# Patient Record
Sex: FEMALE | Race: WHITE | Employment: FULL TIME | URBAN - METROPOLITAN AREA
[De-identification: names, ages, dates, MRNs, and addresses within clinical notes are randomized per-mention and may not be internally consistent; named-entity substitution may affect disease eponyms.]

---

## 2017-05-06 ENCOUNTER — HOSPITAL ENCOUNTER (OUTPATIENT)
Dept: LAB | Age: 52
Discharge: HOME | End: 2017-05-06

## 2017-05-06 PROCEDURE — 99001 SPECIMEN HANDLING PT-LAB: CPT | Performed by: FAMILY MEDICINE

## 2021-04-21 ENCOUNTER — HOSPITAL ENCOUNTER (OUTPATIENT)
Dept: PREADMISSION TESTING | Age: 56
Discharge: HOME | End: 2021-04-21
Payer: COMMERCIAL

## 2021-04-21 ENCOUNTER — TRANSCRIBE ORDER (OUTPATIENT)
Dept: REGISTRATION | Age: 56
End: 2021-04-21

## 2021-04-21 DIAGNOSIS — Z01.812 BLOOD TESTS PRIOR TO TREATMENT OR PROCEDURE: ICD-10-CM

## 2021-04-21 DIAGNOSIS — M16.11 PRIMARY OSTEOARTHRITIS OF RIGHT HIP: Primary | ICD-10-CM

## 2021-04-21 DIAGNOSIS — M16.11 PRIMARY OSTEOARTHRITIS OF RIGHT HIP: ICD-10-CM

## 2021-04-21 LAB
ALBUMIN SERPL-MCNC: 3.8 G/DL (ref 3.4–5)
ALBUMIN/GLOB SERPL: 1.4 {RATIO} (ref 0.8–1.7)
ALP SERPL-CCNC: 90 U/L (ref 45–117)
ALT SERPL-CCNC: 23 U/L (ref 13–56)
ANION GAP SERPL CALC-SCNC: 3 MMOL/L (ref 3–18)
APPEARANCE UR: CLEAR
APTT PPP: 29.5 SEC (ref 23–36.4)
AST SERPL-CCNC: 15 U/L (ref 10–38)
BACTERIA URNS QL MICRO: ABNORMAL /HPF
BASOPHILS # BLD: 0 K/UL (ref 0–0.1)
BASOPHILS NFR BLD: 1 % (ref 0–2)
BILIRUB SERPL-MCNC: 0.5 MG/DL (ref 0.2–1)
BILIRUB UR QL: NEGATIVE
BUN SERPL-MCNC: 13 MG/DL (ref 7–18)
BUN/CREAT SERPL: 20 (ref 12–20)
CALCIUM SERPL-MCNC: 9.2 MG/DL (ref 8.5–10.1)
CHLORIDE SERPL-SCNC: 107 MMOL/L (ref 100–111)
CO2 SERPL-SCNC: 33 MMOL/L (ref 21–32)
COLOR UR: YELLOW
CREAT SERPL-MCNC: 0.65 MG/DL (ref 0.6–1.3)
DIFFERENTIAL METHOD BLD: NORMAL
EOSINOPHIL # BLD: 0.2 K/UL (ref 0–0.4)
EOSINOPHIL NFR BLD: 3 % (ref 0–5)
EPITH CASTS URNS QL MICRO: ABNORMAL /LPF (ref 0–5)
ERYTHROCYTE [DISTWIDTH] IN BLOOD BY AUTOMATED COUNT: 13.2 % (ref 11.6–14.5)
ERYTHROCYTE [SEDIMENTATION RATE] IN BLOOD: 7 MM/HR (ref 0–30)
EST. AVERAGE GLUCOSE BLD GHB EST-MCNC: 123 MG/DL
GLOBULIN SER CALC-MCNC: 2.8 G/DL (ref 2–4)
GLUCOSE SERPL-MCNC: 122 MG/DL (ref 74–99)
GLUCOSE UR STRIP.AUTO-MCNC: NEGATIVE MG/DL
HBA1C MFR BLD: 5.9 % (ref 4.2–5.6)
HCT VFR BLD AUTO: 43.4 % (ref 35–45)
HGB BLD-MCNC: 13.7 G/DL (ref 12–16)
HGB UR QL STRIP: NEGATIVE
INR PPP: 1.1 (ref 0.8–1.2)
KETONES UR QL STRIP.AUTO: NEGATIVE MG/DL
LEUKOCYTE ESTERASE UR QL STRIP.AUTO: ABNORMAL
LYMPHOCYTES # BLD: 1.6 K/UL (ref 0.9–3.6)
LYMPHOCYTES NFR BLD: 31 % (ref 21–52)
MCH RBC QN AUTO: 28.8 PG (ref 24–34)
MCHC RBC AUTO-ENTMCNC: 31.6 G/DL (ref 31–37)
MCV RBC AUTO: 91.2 FL (ref 74–97)
MONOCYTES # BLD: 0.5 K/UL (ref 0.05–1.2)
MONOCYTES NFR BLD: 9 % (ref 3–10)
NEUTS SEG # BLD: 3 K/UL (ref 1.8–8)
NEUTS SEG NFR BLD: 56 % (ref 40–73)
NITRITE UR QL STRIP.AUTO: NEGATIVE
PH UR STRIP: 6.5 [PH] (ref 5–8)
PLATELET # BLD AUTO: 209 K/UL (ref 135–420)
PMV BLD AUTO: 11.7 FL (ref 9.2–11.8)
POTASSIUM SERPL-SCNC: 4.3 MMOL/L (ref 3.5–5.5)
PROT SERPL-MCNC: 6.6 G/DL (ref 6.4–8.2)
PROT UR STRIP-MCNC: NEGATIVE MG/DL
PROTHROMBIN TIME: 14.3 SEC (ref 11.5–15.2)
RBC # BLD AUTO: 4.76 M/UL (ref 4.2–5.3)
RBC #/AREA URNS HPF: ABNORMAL /HPF (ref 0–5)
SODIUM SERPL-SCNC: 143 MMOL/L (ref 136–145)
SP GR UR REFRACTOMETRY: 1.01 (ref 1–1.03)
UROBILINOGEN UR QL STRIP.AUTO: 1 EU/DL (ref 0.2–1)
WBC # BLD AUTO: 5.3 K/UL (ref 4.6–13.2)
WBC URNS QL MICRO: ABNORMAL /HPF (ref 0–5)

## 2021-04-21 PROCEDURE — 80053 COMPREHEN METABOLIC PANEL: CPT

## 2021-04-21 PROCEDURE — 85730 THROMBOPLASTIN TIME PARTIAL: CPT

## 2021-04-21 PROCEDURE — 81001 URINALYSIS AUTO W/SCOPE: CPT

## 2021-04-21 PROCEDURE — 83036 HEMOGLOBIN GLYCOSYLATED A1C: CPT

## 2021-04-21 PROCEDURE — 85610 PROTHROMBIN TIME: CPT

## 2021-04-21 PROCEDURE — 85652 RBC SED RATE AUTOMATED: CPT

## 2021-04-21 PROCEDURE — 93005 ELECTROCARDIOGRAM TRACING: CPT

## 2021-04-21 PROCEDURE — 85025 COMPLETE CBC W/AUTO DIFF WBC: CPT

## 2021-04-21 PROCEDURE — 36415 COLL VENOUS BLD VENIPUNCTURE: CPT

## 2021-04-22 LAB
ATRIAL RATE: 59 BPM
BACTERIA SPEC CULT: ABNORMAL
BACTERIA SPEC CULT: ABNORMAL
CALCULATED P AXIS, ECG09: 42 DEGREES
CALCULATED R AXIS, ECG10: 20 DEGREES
CALCULATED T AXIS, ECG11: 46 DEGREES
DIAGNOSIS, 93000: NORMAL
P-R INTERVAL, ECG05: 142 MS
Q-T INTERVAL, ECG07: 452 MS
QRS DURATION, ECG06: 82 MS
QTC CALCULATION (BEZET), ECG08: 447 MS
SERVICE CMNT-IMP: ABNORMAL
VENTRICULAR RATE, ECG03: 59 BPM

## 2021-05-11 ENCOUNTER — HOSPITAL ENCOUNTER (OUTPATIENT)
Dept: PREADMISSION TESTING | Age: 56
Discharge: HOME | End: 2021-05-11
Payer: COMMERCIAL

## 2021-05-11 PROBLEM — M16.11 PRIMARY LOCALIZED OSTEOARTHRITIS OF RIGHT HIP: Chronic | Status: ACTIVE | Noted: 2021-05-11

## 2021-05-11 PROCEDURE — U0003 INFECTIOUS AGENT DETECTION BY NUCLEIC ACID (DNA OR RNA); SEVERE ACUTE RESPIRATORY SYNDROME CORONAVIRUS 2 (SARS-COV-2) (CORONAVIRUS DISEASE [COVID-19]), AMPLIFIED PROBE TECHNIQUE, MAKING USE OF HIGH THROUGHPUT TECHNOLOGIES AS DESCRIBED BY CMS-2020-01-R: HCPCS

## 2021-05-11 RX ORDER — LEVOTHYROXINE SODIUM 50 UG/1
50 TABLET ORAL
Status: CANCELLED | OUTPATIENT
Start: 2021-05-12

## 2021-05-11 NOTE — H&P
[address] [facility] [address] Medicine  History and Physical Exam    Patient: Richard Ibarra MRN: [id number]  SSN: [id number]    YOB: 1965  Age: 54 y.o. Sex: female      Subjective:      Chief Complaint: right hip pain    History of Present Illness:  Patient complains of right hip pain and difficulty ambulating, which has progressed over the past several months. X-rays showed osteoarthritis of the joint. The patient's pain has persisted and progressed despite conservative treatments and therapies. The patient has been previously treated with nsaids. The patient has at this time opted for surgical intervention. Past Medical History:   Diagnosis Date    Arthritis     Gestational diabetes     Primary localized osteoarthritis of right hip 2021    Thyroid disease     hypo    Type 2 diabetes mellitus ([facility] [address]      Past Surgical History:   Procedure Laterality Date    HX  SECTION   &     HX GASTRIC BYPASS      HX HEENT Bilateral 2019    upper blepharoplasty     Social History     Occupational History    Not on file   Tobacco Use    Smoking status: Former Smoker     Quit date:      Years since quittin.3    Smokeless tobacco: Never Used   Substance and Sexual Activity    Alcohol use: Not Currently    Drug use: Not Currently    Sexual activity: Yes     Prior to Admission medications    Medication Sig Start Date End Date Taking? Authorizing Provider   levothyroxine (synthroid) 50 mcg tablet Take 50 mcg by mouth Daily (before breakfast). Provider, Historical   levothyroxine (synthroid) 25 mcg tablet Take 25 mcg by mouth. Saturday and     Provider, Historical       Allergies: No Known Allergies     Review of Systems:  A comprehensive review of systems was negative except for that written in the History of Present Illness.     Objective:       Physical Exam:  HEENT: Normocephalic, atraumatic  Lungs:  Clear to auscultation  Heart:   Regular rate and rhythm  Abdomen: Soft  Extremities:  Pain with range of motion of the right hip. Passive flexion  degrees,                       passive internal rotation 0-10 degrees, with pain throughout ROM,                        passive external rotation 10-20 degrees with pain at the arc of motion. Antalgic gait noted. Assessment:      Arthritis of the right hip. Plan:       Proceed with scheduled RIGHT TOTAL HIP ARTHROPLASTY. The various methods of treatment have been discussed with the patient and family. After consideration of risks, benefits, and other options for treatment, the patient has consented to surgical interventions. Questions were answered and preoperative teaching was done by Dr Mauricio Meza. The patient would benefit from the use of Vancomycin for antibiotic prophylaxis due to increased risk of infection.     Signed By: VANE Griffiths     May 11, 2021

## 2021-05-11 NOTE — DISCHARGE INSTRUCTIONS
[address] [facility]   Patient Discharge Instructions    Juan Pablo Javier / [id number] : 1965    Admitted (Not on file) Discharged: 2021     IF YOU HAVE ANY PROBLEMS ONCE YOU ARE  [address]:   Main office number: [phone]    Your follow up appointment to see either Dr. Kamla Chappell PA-C, or [facility] as scheduled in 2 weeks. If you are unsure of your appointment date call the office at [phone]. Medication Instructions     · Resume your home medictions as directed, you may have directed not to resume supplements until after your follow up. · A prescription for pain medication has been given   · It is important that you take the medication exactly as they are prescribed. · Keep your medication in the bottles provided by the pharmacist and keep a list of the medication names, dosages, and times to be taken in your wallet. · Do not take other medications without consulting your doctor. What to do at [address] [place] [address] Ave your prehospital diet. If you have excessive nausea or vomitting call your doctor's office. Be sure to maintain adequate fluid intake. Some pain medications may cause constipation. Remember to drink fluids, stay as active as possible, and eat plenty of fiber-rich foods. Begin In-Home Physical Therapy; 3 times a week to work on gait training, range of motion, strengthening, and weight bearing exercises as tolerable. Continue to use your walker or cane when walking. May progress from the walker to a cane to complete total bearing as tolerable. Patient may shower. Wrap incision with plastic wrap/covering to prevent incision from getting wet. Avoid complete immersion. YOUR DRESSING SHOULD BE CHANGED BY YOUR HOME HEALTH NURSE 5-7 AFTER SURGERY ACCORDING TO THE DATE WRITTEN ON YOUR DRESSING.           When to Call    - Call if you have a temperature greater then 101  - Unable to keep food down  - Are unable to bear any wieght   - Need a pain medication refill     Information obtained by :  I understand that if any problems occur once I am at home I am to contact my physician. I understand and acknowledge receipt of the instructions indicated above.                                                                                                                                            Physician's or R.N.'s Signature                                                                  Date/Time                                                                                                                                              Patient or Representative Signature                                                          Date/Time

## 2021-05-12 LAB — SARS-COV-2, COV2NT: NOT DETECTED

## 2021-05-12 NOTE — NURSE NAVIGATOR
Haris Higgins watched the joint seminar online and received a preoperative education booklet in anticipation of joint replacement surgery.      Orthopaedic Nurse Navigator

## 2021-05-14 ENCOUNTER — ANESTHESIA EVENT (OUTPATIENT)
Dept: SURGERY | Age: 56
End: 2021-05-14
Payer: COMMERCIAL

## 2021-05-17 ENCOUNTER — ANESTHESIA (OUTPATIENT)
Dept: SURGERY | Age: 56
End: 2021-05-17
Payer: COMMERCIAL

## 2021-05-17 ENCOUNTER — HOME HEALTH ADMISSION (OUTPATIENT)
Dept: HOME HEALTH SERVICES | Facility: HOME HEALTH | Age: 56
End: 2021-05-17
Payer: COMMERCIAL

## 2021-05-17 ENCOUNTER — HOSPITAL ENCOUNTER (OUTPATIENT)
Age: 56
Discharge: HOME HEALTH | End: 2021-05-17
Attending: ORTHOPAEDIC SURGERY | Admitting: ORTHOPAEDIC SURGERY
Payer: COMMERCIAL

## 2021-05-17 ENCOUNTER — APPOINTMENT (OUTPATIENT)
Dept: GENERAL RADIOLOGY | Age: 56
End: 2021-05-17
Attending: PHYSICIAN ASSISTANT
Payer: COMMERCIAL

## 2021-05-17 ENCOUNTER — APPOINTMENT (OUTPATIENT)
Dept: GENERAL RADIOLOGY | Age: 56
End: 2021-05-17
Attending: ORTHOPAEDIC SURGERY
Payer: COMMERCIAL

## 2021-05-17 VITALS
HEART RATE: 60 BPM | TEMPERATURE: 97.3 F | DIASTOLIC BLOOD PRESSURE: 63 MMHG | RESPIRATION RATE: 16 BRPM | OXYGEN SATURATION: 99 % | BODY MASS INDEX: 31.85 KG/M2 | SYSTOLIC BLOOD PRESSURE: 115 MMHG | WEIGHT: 186.56 LBS | HEIGHT: 64 IN

## 2021-05-17 DIAGNOSIS — M16.11 PRIMARY LOCALIZED OSTEOARTHRITIS OF RIGHT HIP: Primary | Chronic | ICD-10-CM

## 2021-05-17 LAB
ABO + RH BLD: NORMAL
BLOOD GROUP ANTIBODIES SERPL: NORMAL
GLUCOSE BLD STRIP.AUTO-MCNC: 125 MG/DL (ref 70–110)
GLUCOSE BLD STRIP.AUTO-MCNC: 173 MG/DL (ref 70–110)
SPECIMEN EXP DATE BLD: NORMAL

## 2021-05-17 PROCEDURE — 77030012508 HC MSK AIRWY LMA AMBU -A: Performed by: ANESTHESIOLOGY

## 2021-05-17 PROCEDURE — 77030031139 HC SUT VCRL2 J&J -A: Performed by: ORTHOPAEDIC SURGERY

## 2021-05-17 PROCEDURE — 97161 PT EVAL LOW COMPLEX 20 MIN: CPT

## 2021-05-17 PROCEDURE — 36415 COLL VENOUS BLD VENIPUNCTURE: CPT

## 2021-05-17 PROCEDURE — 74011250636 HC RX REV CODE- 250/636: Performed by: ORTHOPAEDIC SURGERY

## 2021-05-17 PROCEDURE — 74011250636 HC RX REV CODE- 250/636: Performed by: PHYSICIAN ASSISTANT

## 2021-05-17 PROCEDURE — 74011000250 HC RX REV CODE- 250: Performed by: ORTHOPAEDIC SURGERY

## 2021-05-17 PROCEDURE — 77030011264 HC ELECTRD BLD EXT COVD -A: Performed by: ORTHOPAEDIC SURGERY

## 2021-05-17 PROCEDURE — 77030038010: Performed by: ORTHOPAEDIC SURGERY

## 2021-05-17 PROCEDURE — 74011636637 HC RX REV CODE- 636/637: Performed by: ANESTHESIOLOGY

## 2021-05-17 PROCEDURE — 97530 THERAPEUTIC ACTIVITIES: CPT

## 2021-05-17 PROCEDURE — 77030037713 HC CLOSR DEV INCIS ZIP STRY -B: Performed by: ORTHOPAEDIC SURGERY

## 2021-05-17 PROCEDURE — 82962 GLUCOSE BLOOD TEST: CPT

## 2021-05-17 PROCEDURE — 76210000016 HC OR PH I REC 1 TO 1.5 HR: Performed by: ORTHOPAEDIC SURGERY

## 2021-05-17 PROCEDURE — 76060000033 HC ANESTHESIA 1 TO 1.5 HR: Performed by: ORTHOPAEDIC SURGERY

## 2021-05-17 PROCEDURE — 74011250636 HC RX REV CODE- 250/636: Performed by: ANESTHESIOLOGY

## 2021-05-17 PROCEDURE — C1776 JOINT DEVICE (IMPLANTABLE): HCPCS | Performed by: ORTHOPAEDIC SURGERY

## 2021-05-17 PROCEDURE — 77030012893: Performed by: ORTHOPAEDIC SURGERY

## 2021-05-17 PROCEDURE — 74011250637 HC RX REV CODE- 250/637: Performed by: ANESTHESIOLOGY

## 2021-05-17 PROCEDURE — 97166 OT EVAL MOD COMPLEX 45 MIN: CPT

## 2021-05-17 PROCEDURE — 76010000149 HC OR TIME 1 TO 1.5 HR: Performed by: ORTHOPAEDIC SURGERY

## 2021-05-17 PROCEDURE — 77030040361 HC SLV COMPR DVT MDII -B: Performed by: ORTHOPAEDIC SURGERY

## 2021-05-17 PROCEDURE — 76210000028 HC REC RM PH II 4 TO 4.5 HR

## 2021-05-17 PROCEDURE — 77030041075 HC DRSG AG OPTIFRM MDII -B: Performed by: ORTHOPAEDIC SURGERY

## 2021-05-17 PROCEDURE — 74011250636 HC RX REV CODE- 250/636: Performed by: NURSE ANESTHETIST, CERTIFIED REGISTERED

## 2021-05-17 PROCEDURE — 77030022295 HC SEAL BPLR VSL DISP MEDT -F: Performed by: ORTHOPAEDIC SURGERY

## 2021-05-17 PROCEDURE — 2709999900 HC NON-CHARGEABLE SUPPLY: Performed by: ORTHOPAEDIC SURGERY

## 2021-05-17 PROCEDURE — 97116 GAIT TRAINING THERAPY: CPT

## 2021-05-17 PROCEDURE — 74011250637 HC RX REV CODE- 250/637: Performed by: ORTHOPAEDIC SURGERY

## 2021-05-17 PROCEDURE — 77030020782 HC GWN BAIR PAWS FLX 3M -B: Performed by: ORTHOPAEDIC SURGERY

## 2021-05-17 PROCEDURE — 77030003666 HC NDL SPINAL BD -A: Performed by: ORTHOPAEDIC SURGERY

## 2021-05-17 PROCEDURE — 77030013708 HC HNDPC SUC IRR PULS STRY –B: Performed by: ORTHOPAEDIC SURGERY

## 2021-05-17 PROCEDURE — 74011000250 HC RX REV CODE- 250: Performed by: NURSE ANESTHETIST, CERTIFIED REGISTERED

## 2021-05-17 PROCEDURE — 97535 SELF CARE MNGMENT TRAINING: CPT

## 2021-05-17 PROCEDURE — 86901 BLOOD TYPING SEROLOGIC RH(D): CPT

## 2021-05-17 PROCEDURE — 77030027138 HC INCENT SPIROMETER -A: Performed by: ORTHOPAEDIC SURGERY

## 2021-05-17 PROCEDURE — 73501 X-RAY EXAM HIP UNI 1 VIEW: CPT

## 2021-05-17 DEVICE — THREE HOLE, 50 MM
Type: IMPLANTABLE DEVICE | Site: HIP | Status: FUNCTIONAL
Brand: LEGEND ACETABULAR SHELL

## 2021-05-17 DEVICE — FEMORAL HEAD 36-12/14+0
Type: IMPLANTABLE DEVICE | Site: HIP | Status: FUNCTIONAL
Brand: DELTA CERAMIC FEMORAL HEAD

## 2021-05-17 DEVICE — SIZE 12 STD COLLAR
Type: IMPLANTABLE DEVICE | Site: HIP | Status: FUNCTIONAL
Brand: ENTRADA HIP STEM

## 2021-05-17 DEVICE — HIP H2 TOT ADV OTHER HD IMPL CAPPED H2 OD: Type: IMPLANTABLE DEVICE | Site: HIP | Status: FUNCTIONAL

## 2021-05-17 DEVICE — ACETABULAR LINER NEUTRAL 36/50
Type: IMPLANTABLE DEVICE | Site: HIP | Status: FUNCTIONAL
Brand: LEGEND

## 2021-05-17 RX ORDER — VANCOMYCIN HYDROCHLORIDE
1250 ONCE
Status: COMPLETED | OUTPATIENT
Start: 2021-05-17 | End: 2021-05-17

## 2021-05-17 RX ORDER — SODIUM CHLORIDE 0.9 % (FLUSH) 0.9 %
5-40 SYRINGE (ML) INJECTION AS NEEDED
Status: DISCONTINUED | OUTPATIENT
Start: 2021-05-17 | End: 2021-05-17 | Stop reason: HOSPADM

## 2021-05-17 RX ORDER — METOCLOPRAMIDE HYDROCHLORIDE 5 MG/ML
10 INJECTION INTRAMUSCULAR; INTRAVENOUS
Status: DISCONTINUED | OUTPATIENT
Start: 2021-05-17 | End: 2021-05-17 | Stop reason: HOSPADM

## 2021-05-17 RX ORDER — HYDROMORPHONE HYDROCHLORIDE 1 MG/ML
0.5 INJECTION, SOLUTION INTRAMUSCULAR; INTRAVENOUS; SUBCUTANEOUS
Status: DISCONTINUED | OUTPATIENT
Start: 2021-05-17 | End: 2021-05-17 | Stop reason: HOSPADM

## 2021-05-17 RX ORDER — LIDOCAINE HYDROCHLORIDE 20 MG/ML
INJECTION, SOLUTION EPIDURAL; INFILTRATION; INTRACAUDAL; PERINEURAL AS NEEDED
Status: DISCONTINUED | OUTPATIENT
Start: 2021-05-17 | End: 2021-05-17 | Stop reason: HOSPADM

## 2021-05-17 RX ORDER — CEFAZOLIN SODIUM/WATER 2 G/20 ML
2 SYRINGE (ML) INTRAVENOUS ONCE
Status: COMPLETED | OUTPATIENT
Start: 2021-05-17 | End: 2021-05-17

## 2021-05-17 RX ORDER — VANCOMYCIN HYDROCHLORIDE
1250 EVERY 12 HOURS
Status: DISCONTINUED | OUTPATIENT
Start: 2021-05-17 | End: 2021-05-17 | Stop reason: HOSPADM

## 2021-05-17 RX ORDER — HYDROMORPHONE HYDROCHLORIDE 1 MG/ML
0.2 INJECTION, SOLUTION INTRAMUSCULAR; INTRAVENOUS; SUBCUTANEOUS AS NEEDED
Status: DISCONTINUED | OUTPATIENT
Start: 2021-05-17 | End: 2021-05-17 | Stop reason: HOSPADM

## 2021-05-17 RX ORDER — NALOXONE HYDROCHLORIDE 0.4 MG/ML
0.1 INJECTION, SOLUTION INTRAMUSCULAR; INTRAVENOUS; SUBCUTANEOUS AS NEEDED
Status: DISCONTINUED | OUTPATIENT
Start: 2021-05-17 | End: 2021-05-17 | Stop reason: HOSPADM

## 2021-05-17 RX ORDER — NALOXONE HYDROCHLORIDE 0.4 MG/ML
0.4 INJECTION, SOLUTION INTRAMUSCULAR; INTRAVENOUS; SUBCUTANEOUS AS NEEDED
Status: DISCONTINUED | OUTPATIENT
Start: 2021-05-17 | End: 2021-05-17 | Stop reason: HOSPADM

## 2021-05-17 RX ORDER — ZOLPIDEM TARTRATE 5 MG/1
5-10 TABLET ORAL
Status: DISCONTINUED | OUTPATIENT
Start: 2021-05-17 | End: 2021-05-17 | Stop reason: HOSPADM

## 2021-05-17 RX ORDER — CEFADROXIL 500 MG/1
500 CAPSULE ORAL 2 TIMES DAILY
Qty: 10 CAP | Refills: 0 | Status: SHIPPED | OUTPATIENT
Start: 2021-05-17 | End: 2021-05-22

## 2021-05-17 RX ORDER — ACETAMINOPHEN 500 MG
1000 TABLET ORAL ONCE
Status: COMPLETED | OUTPATIENT
Start: 2021-05-17 | End: 2021-05-17

## 2021-05-17 RX ORDER — OXYCODONE HYDROCHLORIDE 5 MG/1
5 TABLET ORAL
Status: DISCONTINUED | OUTPATIENT
Start: 2021-05-17 | End: 2021-05-17 | Stop reason: HOSPADM

## 2021-05-17 RX ORDER — GLUCOSAMINE/CHONDR SU A SOD 750-600 MG
TABLET ORAL DAILY
COMMUNITY
End: 2021-05-17

## 2021-05-17 RX ORDER — FLUMAZENIL 0.1 MG/ML
0.2 INJECTION INTRAVENOUS
Status: DISCONTINUED | OUTPATIENT
Start: 2021-05-17 | End: 2021-05-17 | Stop reason: HOSPADM

## 2021-05-17 RX ORDER — DEXMEDETOMIDINE HYDROCHLORIDE 100 UG/ML
INJECTION, SOLUTION INTRAVENOUS AS NEEDED
Status: DISCONTINUED | OUTPATIENT
Start: 2021-05-17 | End: 2021-05-17 | Stop reason: HOSPADM

## 2021-05-17 RX ORDER — DEXTROSE 50 % IN WATER (D50W) INTRAVENOUS SYRINGE
25-50 AS NEEDED
Status: DISCONTINUED | OUTPATIENT
Start: 2021-05-17 | End: 2021-05-17 | Stop reason: HOSPADM

## 2021-05-17 RX ORDER — SODIUM CHLORIDE 9 MG/ML
300 INJECTION, SOLUTION INTRAVENOUS CONTINUOUS
Status: DISCONTINUED | OUTPATIENT
Start: 2021-05-17 | End: 2021-05-17 | Stop reason: HOSPADM

## 2021-05-17 RX ORDER — OXYCODONE HYDROCHLORIDE 5 MG/1
5 TABLET ORAL
Qty: 60 TAB | Refills: 0 | Status: SHIPPED | OUTPATIENT
Start: 2021-05-17 | End: 2021-05-24

## 2021-05-17 RX ORDER — CEFAZOLIN SODIUM/WATER 2 G/20 ML
2 SYRINGE (ML) INTRAVENOUS EVERY 8 HOURS
Status: DISCONTINUED | OUTPATIENT
Start: 2021-05-17 | End: 2021-05-17 | Stop reason: HOSPADM

## 2021-05-17 RX ORDER — PANTOPRAZOLE SODIUM 40 MG/1
40 TABLET, DELAYED RELEASE ORAL DAILY
Status: DISCONTINUED | OUTPATIENT
Start: 2021-05-17 | End: 2021-05-17 | Stop reason: HOSPADM

## 2021-05-17 RX ORDER — KETOROLAC TROMETHAMINE 15 MG/ML
15 INJECTION, SOLUTION INTRAMUSCULAR; INTRAVENOUS EVERY 6 HOURS
Status: DISCONTINUED | OUTPATIENT
Start: 2021-05-17 | End: 2021-05-17 | Stop reason: HOSPADM

## 2021-05-17 RX ORDER — TRANEXAMIC ACID 650 1/1
1950 TABLET ORAL ONCE
Status: DISCONTINUED | OUTPATIENT
Start: 2021-05-17 | End: 2021-05-17

## 2021-05-17 RX ORDER — PANTOPRAZOLE SODIUM 40 MG/1
40 TABLET, DELAYED RELEASE ORAL DAILY
Status: DISCONTINUED | OUTPATIENT
Start: 2021-05-17 | End: 2021-05-17

## 2021-05-17 RX ORDER — SODIUM CHLORIDE 9 MG/ML
125 INJECTION, SOLUTION INTRAVENOUS CONTINUOUS
Status: DISCONTINUED | OUTPATIENT
Start: 2021-05-17 | End: 2021-05-17 | Stop reason: HOSPADM

## 2021-05-17 RX ORDER — INSULIN LISPRO 100 [IU]/ML
INJECTION, SOLUTION INTRAVENOUS; SUBCUTANEOUS ONCE
Status: COMPLETED | OUTPATIENT
Start: 2021-05-17 | End: 2021-05-17

## 2021-05-17 RX ORDER — ASPIRIN 325 MG
325 TABLET ORAL 2 TIMES DAILY
Qty: 42 TAB | Refills: 0 | Status: SHIPPED | OUTPATIENT
Start: 2021-05-17 | End: 2021-06-07

## 2021-05-17 RX ORDER — SODIUM CHLORIDE, SODIUM LACTATE, POTASSIUM CHLORIDE, CALCIUM CHLORIDE 600; 310; 30; 20 MG/100ML; MG/100ML; MG/100ML; MG/100ML
125 INJECTION, SOLUTION INTRAVENOUS CONTINUOUS
Status: DISCONTINUED | OUTPATIENT
Start: 2021-05-17 | End: 2021-05-17 | Stop reason: HOSPADM

## 2021-05-17 RX ORDER — GLYCOPYRROLATE 0.2 MG/ML
INJECTION INTRAMUSCULAR; INTRAVENOUS AS NEEDED
Status: DISCONTINUED | OUTPATIENT
Start: 2021-05-17 | End: 2021-05-17 | Stop reason: HOSPADM

## 2021-05-17 RX ORDER — OXYCODONE HYDROCHLORIDE 5 MG/1
10 TABLET ORAL
Status: DISCONTINUED | OUTPATIENT
Start: 2021-05-17 | End: 2021-05-17 | Stop reason: HOSPADM

## 2021-05-17 RX ORDER — HYDROMORPHONE HYDROCHLORIDE 2 MG/ML
INJECTION, SOLUTION INTRAMUSCULAR; INTRAVENOUS; SUBCUTANEOUS AS NEEDED
Status: DISCONTINUED | OUTPATIENT
Start: 2021-05-17 | End: 2021-05-17 | Stop reason: HOSPADM

## 2021-05-17 RX ORDER — ACETAMINOPHEN 325 MG/1
650 TABLET ORAL EVERY 6 HOURS
Status: DISCONTINUED | OUTPATIENT
Start: 2021-05-17 | End: 2021-05-17 | Stop reason: HOSPADM

## 2021-05-17 RX ORDER — KETOROLAC TROMETHAMINE 15 MG/ML
INJECTION, SOLUTION INTRAMUSCULAR; INTRAVENOUS AS NEEDED
Status: DISCONTINUED | OUTPATIENT
Start: 2021-05-17 | End: 2021-05-17 | Stop reason: HOSPADM

## 2021-05-17 RX ORDER — ASPIRIN 325 MG
325 TABLET, DELAYED RELEASE (ENTERIC COATED) ORAL 2 TIMES DAILY
Status: DISCONTINUED | OUTPATIENT
Start: 2021-05-17 | End: 2021-05-17 | Stop reason: HOSPADM

## 2021-05-17 RX ORDER — SODIUM CHLORIDE 0.9 % (FLUSH) 0.9 %
5-40 SYRINGE (ML) INJECTION EVERY 8 HOURS
Status: DISCONTINUED | OUTPATIENT
Start: 2021-05-17 | End: 2021-05-17 | Stop reason: HOSPADM

## 2021-05-17 RX ORDER — PROPOFOL 10 MG/ML
INJECTION, EMULSION INTRAVENOUS AS NEEDED
Status: DISCONTINUED | OUTPATIENT
Start: 2021-05-17 | End: 2021-05-17 | Stop reason: HOSPADM

## 2021-05-17 RX ORDER — DEXAMETHASONE SODIUM PHOSPHATE 4 MG/ML
4 INJECTION, SOLUTION INTRA-ARTICULAR; INTRALESIONAL; INTRAMUSCULAR; INTRAVENOUS; SOFT TISSUE ONCE
Status: COMPLETED | OUTPATIENT
Start: 2021-05-17 | End: 2021-05-17

## 2021-05-17 RX ORDER — DOCUSATE SODIUM 100 MG/1
100 CAPSULE, LIQUID FILLED ORAL 2 TIMES DAILY
Status: DISCONTINUED | OUTPATIENT
Start: 2021-05-17 | End: 2021-05-17 | Stop reason: HOSPADM

## 2021-05-17 RX ORDER — LANOLIN ALCOHOL/MO/W.PET/CERES
1 CREAM (GRAM) TOPICAL 3 TIMES DAILY
Status: DISCONTINUED | OUTPATIENT
Start: 2021-05-17 | End: 2021-05-17 | Stop reason: HOSPADM

## 2021-05-17 RX ORDER — TRANEXAMIC ACID 10 MG/ML
1 INJECTION, SOLUTION INTRAVENOUS SEE ADMIN INSTRUCTIONS
Status: COMPLETED | OUTPATIENT
Start: 2021-05-17 | End: 2021-05-17

## 2021-05-17 RX ORDER — DEXAMETHASONE SODIUM PHOSPHATE 4 MG/ML
8 INJECTION, SOLUTION INTRA-ARTICULAR; INTRALESIONAL; INTRAMUSCULAR; INTRAVENOUS; SOFT TISSUE ONCE
Status: DISCONTINUED | OUTPATIENT
Start: 2021-05-17 | End: 2021-05-17

## 2021-05-17 RX ORDER — MIDAZOLAM HYDROCHLORIDE 1 MG/ML
INJECTION, SOLUTION INTRAMUSCULAR; INTRAVENOUS AS NEEDED
Status: DISCONTINUED | OUTPATIENT
Start: 2021-05-17 | End: 2021-05-17 | Stop reason: HOSPADM

## 2021-05-17 RX ORDER — MAGNESIUM SULFATE 100 %
4 CRYSTALS MISCELLANEOUS AS NEEDED
Status: DISCONTINUED | OUTPATIENT
Start: 2021-05-17 | End: 2021-05-17 | Stop reason: HOSPADM

## 2021-05-17 RX ORDER — MELOXICAM 7.5 MG/1
7.5 TABLET ORAL 2 TIMES DAILY
Qty: 28 TAB | Refills: 0 | Status: SHIPPED | OUTPATIENT
Start: 2021-05-17 | End: 2021-05-31

## 2021-05-17 RX ORDER — SODIUM CHLORIDE, SODIUM LACTATE, POTASSIUM CHLORIDE, CALCIUM CHLORIDE 600; 310; 30; 20 MG/100ML; MG/100ML; MG/100ML; MG/100ML
1000 INJECTION, SOLUTION INTRAVENOUS CONTINUOUS
Status: DISCONTINUED | OUTPATIENT
Start: 2021-05-17 | End: 2021-05-17 | Stop reason: HOSPADM

## 2021-05-17 RX ORDER — LANOLIN ALCOHOL/MO/W.PET/CERES
1000 CREAM (GRAM) TOPICAL DAILY
COMMUNITY

## 2021-05-17 RX ORDER — DIPHENHYDRAMINE HYDROCHLORIDE 50 MG/ML
12.5 INJECTION, SOLUTION INTRAMUSCULAR; INTRAVENOUS
Status: DISCONTINUED | OUTPATIENT
Start: 2021-05-17 | End: 2021-05-17 | Stop reason: HOSPADM

## 2021-05-17 RX ORDER — ONDANSETRON 2 MG/ML
INJECTION INTRAMUSCULAR; INTRAVENOUS AS NEEDED
Status: DISCONTINUED | OUTPATIENT
Start: 2021-05-17 | End: 2021-05-17 | Stop reason: HOSPADM

## 2021-05-17 RX ORDER — ONDANSETRON 2 MG/ML
4 INJECTION INTRAMUSCULAR; INTRAVENOUS
Status: DISCONTINUED | OUTPATIENT
Start: 2021-05-17 | End: 2021-05-17 | Stop reason: HOSPADM

## 2021-05-17 RX ADMIN — CEFAZOLIN 2 G: 10 INJECTION, POWDER, FOR SOLUTION INTRAVENOUS at 09:14

## 2021-05-17 RX ADMIN — PANTOPRAZOLE SODIUM 40 MG: 40 TABLET, DELAYED RELEASE ORAL at 09:08

## 2021-05-17 RX ADMIN — DEXAMETHASONE SODIUM PHOSPHATE 4 MG: 4 INJECTION, SOLUTION INTRAMUSCULAR; INTRAVENOUS at 09:08

## 2021-05-17 RX ADMIN — VANCOMYCIN HYDROCHLORIDE 1250 MG: 10 INJECTION, POWDER, LYOPHILIZED, FOR SOLUTION INTRAVENOUS at 09:14

## 2021-05-17 RX ADMIN — SODIUM CHLORIDE, SODIUM LACTATE, POTASSIUM CHLORIDE, AND CALCIUM CHLORIDE 1000 ML: 600; 310; 30; 20 INJECTION, SOLUTION INTRAVENOUS at 08:04

## 2021-05-17 RX ADMIN — HYDROMORPHONE HYDROCHLORIDE 1 MG: 2 INJECTION, SOLUTION INTRAMUSCULAR; INTRAVENOUS; SUBCUTANEOUS at 11:00

## 2021-05-17 RX ADMIN — MIDAZOLAM 2 MG: 1 INJECTION INTRAMUSCULAR; INTRAVENOUS at 10:52

## 2021-05-17 RX ADMIN — ONDANSETRON HYDROCHLORIDE 4 MG: 2 INJECTION INTRAMUSCULAR; INTRAVENOUS at 11:02

## 2021-05-17 RX ADMIN — KETOROLAC TROMETHAMINE 30 MG: 15 INJECTION, SOLUTION INTRAMUSCULAR; INTRAVENOUS at 11:39

## 2021-05-17 RX ADMIN — PROPOFOL 200 MG: 10 INJECTION, EMULSION INTRAVENOUS at 11:00

## 2021-05-17 RX ADMIN — SODIUM CHLORIDE, SODIUM LACTATE, POTASSIUM CHLORIDE, AND CALCIUM CHLORIDE 125 ML/HR: 600; 310; 30; 20 INJECTION, SOLUTION INTRAVENOUS at 08:04

## 2021-05-17 RX ADMIN — GLYCOPYRROLATE 0.2 MG: 0.2 INJECTION INTRAMUSCULAR; INTRAVENOUS at 10:52

## 2021-05-17 RX ADMIN — LIDOCAINE HYDROCHLORIDE 80 MG: 20 INJECTION, SOLUTION EPIDURAL; INFILTRATION; INTRACAUDAL; PERINEURAL at 11:00

## 2021-05-17 RX ADMIN — DEXMEDETOMIDINE HYDROCHLORIDE 16 MCG: 100 INJECTION, SOLUTION INTRAVENOUS at 11:02

## 2021-05-17 RX ADMIN — TRANEXAMIC ACID 1 G: 10 INJECTION, SOLUTION INTRAVENOUS at 11:36

## 2021-05-17 RX ADMIN — ACETAMINOPHEN 1000 MG: 500 TABLET ORAL at 09:08

## 2021-05-17 RX ADMIN — SODIUM CHLORIDE, SODIUM LACTATE, POTASSIUM CHLORIDE, AND CALCIUM CHLORIDE 1000 ML: 600; 310; 30; 20 INJECTION, SOLUTION INTRAVENOUS at 12:44

## 2021-05-17 RX ADMIN — TRANEXAMIC ACID 1 G: 10 INJECTION, SOLUTION INTRAVENOUS at 11:02

## 2021-05-17 RX ADMIN — INSULIN LISPRO 3 UNITS: 100 INJECTION, SOLUTION INTRAVENOUS; SUBCUTANEOUS at 12:26

## 2021-05-17 NOTE — PROGRESS NOTES
Problem: Mobility Impaired (Adult and Pediatric)  Goal: *Acute Goals and Plan of Care (Insert Text)  Description: Physical Therapy short term goals initiated 05/17/21, to be achieved in 2 days. Pt will:   1. Perform bed mobility with indep in prep for OOB activity. 2. Perform sit <> stand transfers with RW and S in prep for functional mobility and ambulation. 3. Ambulate 150 ft with RW and S in prep for household and community mobility. 4. Ascend/descend 2 stairs with 1 HR and S for safe home entry. 5/17/2021 1650 by Rashaun Coulter  Note: [x]  Patient has met MD grayson mays for d/c home   [x]  Recommend HH with 24 hour adult care   []  Benefit from additional acute PT session to address:      PHYSICAL THERAPY TREATMENT    Patient: Liane Rico (22 y.o. female)  Date: 5/17/2021  Diagnosis: Primary localized osteoarthritis of right hip [M16.11] Primary localized osteoarthritis of right hip  Procedure(s) (LRB):  RIGHT TOTAL HIP REPLACEMENT ANTERIOR APPROACH WITH C-ARM (ORTHO DEVELOPMENT) **CONTACT ISOLATION** (Right) Day of Surgery  Precautions: Fall, WBAT  PLOF: Pt was independent with household and community mobility with no AD PTA. ASSESSMENT:  Pt seen for second session after requesting time to rest after amb in room. Pt sitting in chair on PT entry, continues to deny R hip pain. Pt performed transfers with CGA/SBA and RW. Pt amb in perez with Rw and CGA with no LOB, continued antalgic pattern, progressed to reciprocal gait. Pt negotiated 3 stairs with B HR and CGA with vc for sequencing and safety. Pt returned to recliner educated on home safety, appropriate use of RW, importance of early mobility, and use of ice to decr pain and inflammation. Pt left in recliner with all needs met and all questions answered.   Progression toward goals:   [x]      Improving appropriately and progressing toward goals  []      Improving slowly and progressing toward goals  []      Not making progress toward goals and plan of care will be adjusted     PLAN:  Patient continues to benefit from skilled intervention to address the above impairments. Continue treatment per established plan of care. Discharge Recommendations:  Home Health  Further Equipment Recommendations for Discharge:  N/A     SUBJECTIVE:   Patient stated i'm tired still, but ready to go home.     OBJECTIVE DATA SUMMARY:   Critical Behavior:  Neurologic State: Drowsy, Alert  Orientation Level: Oriented to person, Oriented to place, Oriented to situation  Cognition: Follows commands  Safety/Judgement: Awareness of environment  Functional Mobility Training:  Bed Mobility:  Scooting: Stand-by assistance  Transfers:  Sit to Stand: Contact guard assistance;Stand-by assistance  Stand to Sit: Contact guard assistance;Stand-by assistance  Balance:  Sitting: Intact  Standing: Intact; With support   Range Of Motion:   AROM: Generally decreased, functional  Ambulation/Gait Training:  Distance (ft): 60 Feet (ft)  Assistive Device: Gait belt;Walker, rolling  Ambulation - Level of Assistance: Contact guard assistance  Gait Abnormalities: Antalgic;Decreased step clearance; Step to gait  Base of Support: Narrowed  Stance: Right decreased  Speed/Marlyn: Slow  Step Length: Left shortened;Right shortened  Interventions: Safety awareness training; Tactile cues; Verbal cues  Stairs:  Number of Stairs Trained: 3  Stairs - Level of Assistance: Contact guard assistance  Rail Use: Both  Pain:  Pain level pre-treatment: 0/10  Pain level post-treatment: 0/10   Pain Intervention(s): Medication (see MAR); Rest, Ice, Repositioning   Response to intervention: Nurse notified, See doc flow  Activity Tolerance:   Pt tolerated amb in perez with RW and stair negotiation with no LOB, denied pain t/o. Please refer to the flowsheet for vital signs taken during this treatment.   After treatment:   [x] Patient left in no apparent distress sitting up in chair  [] Patient left in no apparent distress in bed  [x] Call bell left within reach  [x] Nursing notified  [] Caregiver present  [] Bed alarm activated  [] SCDs applied      COMMUNICATION/EDUCATION:   [x]         Role of Physical Therapy in the acute care setting. [x]         Fall prevention education was provided and the patient/caregiver indicated understanding. [x]         Patient/family have participated as able in working toward goals and plan of care. [x]         Patient/family agree to work toward stated goals and plan of care. []         Patient understands intent and goals of therapy, but is neutral about his/her participation. []         Patient is unable to participate in stated goals/plan of care: ongoing with therapy staff.  []         Other: Linda Bertrand   Time Calculation: 11 mins    5/17/2021 1614 by Miryam Rahman  Note: []  Patient has met MD grayson mays for d/c home   []  Recommend HH with 24 hour adult care   [x]  Benefit from additional acute PT session to address: incr gait tolerance, stair training    PHYSICAL THERAPY EVALUATION    Patient: Emi Perdue (49 y.o. female)  Date: 5/17/2021  Primary Diagnosis: Primary localized osteoarthritis of right hip [M16.11]  Procedure(s) (LRB):  RIGHT TOTAL HIP REPLACEMENT ANTERIOR APPROACH WITH C-ARM (ORTHO DEVELOPMENT) **CONTACT ISOLATION** (Right) Day of Surgery   Precautions:  Fall, WBAT  WBAT  PLOF: Pt was independent with household and community mobility with no AD PTA. ASSESSMENT :  Based on the objective data described below, the patient presents with decr R LE strength and  ROM, decr activity tolerance, decr balance, and R hip pain resulting in deficits in transfers and gait quality and tolerance. Pt seen with OT for additional set of skilled hands. Pt sitting in recliner on PT entry, rated R hip pain 0/10. Pt required ** for sit <> stand transfers with RW and vc for safe hand placement.  Pt ambulated ambulated to bathroom with RW and CGA, antalgic pattern. Pt able to void, requesting to return to chair and rest d/t fatigue. Pt returned to chair, educated on PT plan of care. Pt left sitting in recliner with all needs met, all questions answered, and ice to R hip. Pt would benefit from additional skilled therapy in order to incr functional mobility and promote safe discharge home. Recommend HHPT after discharge. Patient will benefit from skilled intervention to address the above impairments. Patient's rehabilitation potential is considered to be Good  Factors which may influence rehabilitation potential include:   []         None noted  []         Mental ability/status  []         Medical condition  []         Home/family situation and support systems  []         Safety awareness  [x]         Pain tolerance/management  []         Other:      PLAN :  Recommendations and Planned Interventions:   [x]           Bed Mobility Training             []    Neuromuscular Re-Education  [x]           Transfer Training                   []    Orthotic/Prosthetic Training  [x]           Gait Training                          [x]    Modalities  [x]           Therapeutic Exercises           []    Edema Management/Control  [x]           Therapeutic Activities            [x]    Family Training/Education  [x]           Patient Education  []           Other (comment):    Frequency/Duration: Patient will be followed by physical therapy twice daily to address goals. Discharge Recommendations: Home Health  Further Equipment Recommendations for Discharge: N/A     SUBJECTIVE:   Patient stated i'm just so tired.     OBJECTIVE DATA SUMMARY:     Past Medical History:   Diagnosis Date    Arthritis     Gestational diabetes     Primary localized osteoarthritis of right hip 2021    Thyroid disease     hypo    Type 2 diabetes mellitus ([facility] [address]      Past Surgical History:   Procedure Laterality Date    HX  SECTION   &     HX GASTRIC BYPASS      HX HEENT Bilateral 2019 upper blepharoplasty     Barriers to Learning/Limitations: yes;  other post-anesthesia  Compensate with: Verbal Cues and Tactile Cues  Home Situation:  Home Situation  Home Environment: Private residence  # Steps to Enter: 2  Rails to Enter: Yes  Hand Rails : Right  One/Two Story Residence: One story  Living Alone: No  Support Systems: Spouse/Significant Other/Partner  Patient Expects to be Discharged to[de-identified] Private residence  Current DME Used/Available at Home: Walker, rolling, Raised toilet seat  Tub or Shower Type: Tub/Shower combination  Critical Behavior:  Neurologic State: Drowsy; Alert  Orientation Level: Oriented to person;Oriented to place;Oriented to situation  Cognition: Follows commands  Safety/Judgement: Awareness of environment  Psychosocial  Patient Behaviors: Calm; Cooperative  Family  Behaviors: Calm;Supportive  Purposeful Interaction: Yes  Pt Identified Daily Priority: Clinical issues (comment)  Caritas Process: Nurture loving kindness;Establish trust;Teaching/learning; Attend basic human needs;Create healing environment  Caring Interventions: Reassure; Therapeutic modalities  Reassure: Therapeutic listening; Informing  Therapeutic Modalities: Intentional therapeutic touch  Skin Condition/Temp: Warm;Dry  Family  Behaviors: Calm;Supportive  Skin Integrity: Incision (comment)(R hip)  Skin Integumentary  Skin Color: Appropriate for ethnicity  Skin Condition/Temp: Warm;Dry  Skin Integrity: Incision (comment)(R hip)  Strength:    Strength: Generally decreased, functional  Tone & Sensation:   Tone: Normal  Sensation: Impaired(R hip)  Range Of Motion:  AROM: Generally decreased, functional  Functional Mobility:  Bed Mobility:  Scooting: Stand-by assistance  Transfers:  Sit to Stand: Contact guard assistance;Stand-by assistance  Stand to Sit: Contact guard assistance;Stand-by assistance  Balance:   Sitting: Intact  Standing: Intact; With support  Ambulation/Gait Training:  Distance (ft): 20 Feet (ft)  Assistive Device: Gait belt;Walker, rolling  Ambulation - Level of Assistance: Contact guard assistance  Gait Abnormalities: Antalgic;Decreased step clearance; Step to gait  Base of Support: Narrowed  Stance: Right decreased  Speed/Marlyn: Slow  Step Length: Left shortened;Right shortened  Interventions: Safety awareness training; Tactile cues; Verbal cues  Pain:  Pain level pre-treatment: 0/10   Pain level post-treatment: 0/10   Pain Intervention(s) : Medication (see MAR); Rest, Ice, Repositioning  Response to intervention: Nurse notified, See doc flow  Activity Tolerance:   Pt tolerated amb in room with Rw and CGA with no LOB, reported fatigue and requesting rest break after amb <> bathroom. Please refer to the flowsheet for vital signs taken during this treatment. After treatment:   [x]         Patient left in no apparent distress sitting up in chair  []         Patient left in no apparent distress in bed  [x]         Call bell left within reach  [x]         Nursing notified  []         Caregiver present  []         Bed alarm activated  []         SCDs applied    COMMUNICATION/EDUCATION:   [x]         Role of Physical Therapy in the acute care setting. [x]         Fall prevention education was provided and the patient/caregiver indicated understanding. [x]         Patient/family have participated as able in goal setting and plan of care. [x]         Patient/family agree to work toward stated goals and plan of care. []         Patient understands intent and goals of therapy, but is neutral about his/her participation. []         Patient is unable to participate in goal setting/plan of care: ongoing with therapy staff.  []         Other:     Thank you for this referral.  Rita Julian   Time Calculation: 26 mins      Eval Complexity: History: LOW Complexity : Zero comorbidities / personal factors that will impact the outcome / POCExam:LOW Complexity : 1-2 Standardized tests and measures addressing body structure, function, activity limitation and / or participation in recreation  Presentation: LOW Complexity : Stable, uncomplicated  Clinical Decision Making:Low Complexity    Overall Complexity:LOW

## 2021-05-17 NOTE — PERIOP NOTES
TRANSFER - OUT REPORT:    Verbal report given to [facility] on July Client  being transferred to [facility] for routine post - op       Report consisted of patients Situation, Background, Assessment and   Recommendations(SBAR). Information from the following report(s) SBAR, OR Summary, Intake/Output and Recent Results was reviewed with the receiving nurse. Lines:   Peripheral IV 05/17/21 Left Forearm (Active)   Site Assessment Clean, dry, & intact 05/17/21 1221   Phlebitis Assessment 0 05/17/21 1221   Infiltration Assessment 0 05/17/21 1221   Dressing Status Clean, dry, & intact 05/17/21 1221   Dressing Type Tape;Transparent 05/17/21 1221   Hub Color/Line Status Green; Infusing 05/17/21 1221        Opportunity for questions and clarification was provided.       Patient transported with:   O2 @ 1 liters  Registered Nurse

## 2021-05-17 NOTE — ANESTHESIA POSTPROCEDURE EVALUATION
Procedure(s):  RIGHT TOTAL HIP REPLACEMENT ANTERIOR APPROACH WITH C-ARM (ORTHO DEVELOPMENT) **CONTACT ISOLATION**.    general    Anesthesia Post Evaluation        Comments: Post-Anesthesia Evaluation and Assessment    Cardiovascular Function/Vital Signs  BP (!) 115/59   Pulse (!) 55   Temp 36.9 °C (98.4 °F)   Resp 13   Ht 5' 4\" (1.626 m)   Wt 84.6 kg (186 lb 9 oz)   SpO2 98%   BMI 32.02 kg/m²     Patient is status post Procedure(s):  RIGHT TOTAL HIP REPLACEMENT ANTERIOR APPROACH WITH C-ARM (ORTHO DEVELOPMENT) **CONTACT ISOLATION**. Nausea/Vomiting: Controlled. Postoperative hydration reviewed and adequate. Pain:  Pain Scale 1: Numeric (0 - 10) (05/17/21 1233)  Pain Intensity 1: 0 (05/17/21 1233)   Managed. Neurological Status:   Neuro (WDL): Exceptions to WDL (05/17/21 1221)   At baseline. Mental Status and Level of Consciousness: Arousable. Pulmonary Status:   O2 Device: Nasal cannula (05/17/21 1259)   Adequate oxygenation and airway patent. Complications related to anesthesia: None    Post-anesthesia assessment completed. No concerns. Patient has met all discharge requirements. Signed By: Jayshree Rodriguez MD    May 17, 2021                   INITIAL Post-op Vital signs:   Vitals Value Taken Time   /59 05/17/21 1311   Temp 36.9 °C (98.4 °F) 05/17/21 1205   Pulse 52 05/17/21 1314   Resp 13 05/17/21 1314   SpO2 98 % 05/17/21 1314   Vitals shown include unvalidated device data.

## 2021-05-17 NOTE — PROGRESS NOTES
Problem: Self Care Deficits Care Plan (Adult)  Goal: *Acute Goals and Plan of Care (Insert Text)  Description: Initial Occupational Therapy Goals (5/17/2021) Within 7 day(s):    1. Patient will perform grooming standing sinkside with supervision for increased independence with ADLs. 2. Patient will perform LB dressing with supervision & A/E PRN for increased independence with ADLs. 3. Patient will perform toilet transfer with supervision for increased independence with ADLs. 4. Patient will perform all aspects of toileting with supervision for increased independence with ADLs. 5. Patient will independently apply energy conservation techniques with 1 verbal cue(s)for increased independence with ADLs. 6. Patient will perform bathroom mobility with supervision for increased independence/safety with ADLs. 5/17/2021 1609 by Erika Reyes OT  Outcome: Progressing Towards Goal  OCCUPATIONAL THERAPY TREATMENT    Patient: Juan Pablo Herrera (45 y.o. female)  Date: 5/17/2021  Diagnosis: Primary localized osteoarthritis of right hip [M16.11] Primary localized osteoarthritis of right hip  Procedure(s) (LRB):  RIGHT TOTAL HIP REPLACEMENT ANTERIOR APPROACH WITH C-ARM (ORTHO DEVELOPMENT) **CONTACT ISOLATION** (Right) Day of Surgery  Precautions: Fall, WBAT    Chart, occupational therapy assessment, plan of care, and goals were reviewed. ASSESSMENT:  Pt found seated in chair, agreeable to therapy, reporting pain 0/10. Pt unable to complete sock donning without assist. Provided/educated on use of sock aid for increased independence. Pt able to don B socks with SBA. Provided opportunity for pt to voice questions on ADL performance when home, pt has no further concerns.     Progression toward goals:  [x]          Improving appropriately and progressing toward goals  []          Improving slowly and progressing toward goals  []          Not making progress toward goals and plan of care will be adjusted     PLAN:  Patient continues to benefit from skilled intervention to address the above impairments. Continue treatment per established plan of care. Discharge Recommendations:  Home Health  Further Equipment Recommendations for Discharge:  N/A     SUBJECTIVE:   Patient stated I just needed a rest break.     OBJECTIVE DATA SUMMARY:   Cognitive/Behavioral Status:  Neurologic State: Drowsy, Alert  Orientation Level: Oriented to person, Oriented to place, Oriented to situation  Cognition: Follows commands  Safety/Judgement: Awareness of environment    Functional Mobility and Transfers for ADLs:   Bed Mobility:  Scooting: Stand-by assistance     Transfers:  Sit to Stand: Contact guard assistance;Stand-by assistance   Toilet Transfer : Contact guard assistance(vc)   Bathroom Mobility: Contact guard assistance(vc)    Balance:  Sitting: Intact  Standing: Intact; With support    ADL Intervention:  Grooming  Grooming Assistance: Contact guard assistance  Position Performed: Standing  Washing Hands: Contact guard assistance    Lower Body Dressing Assistance  Dressing Assistance: Stand-by assistance  Underpants: Contact guard assistance  Pants With Elastic Waist: Contact guard assistance  Socks: Stand-by assistance  Leg Crossed Method Used: No  Position Performed: Seated in chair  Cues: Verbal cues provided;Visual cues provided  Adaptive Equipment Used: Sock aid    Toileting  Toileting Assistance: Stand-by assistance  Bladder Hygiene: Stand-by assistance  Clothing Management: Stand-by assistance    Cognitive Retraining  Safety/Judgement: Awareness of environment    Pain:  Pain level pre-treatment: 0/10   Pain level post-treatment: 0/10  Pain Intervention(s): Medication administered by RN (see MAR); Rest, Ice, Repositioning   Response to intervention: Nurse notified, See doc flow sheet    Activity Tolerance:    Fair. Pt able to stand ~5 minutes.  Pt limited by pain, strength, ROM    Please refer to the flowsheet for vital signs taken during this treatment. After treatment:   [x]  Patient left in no apparent distress sitting up in chair  []  Patient left in no apparent distress in bed  [x]  Call bell left within reach  [x]  Nursing notified  [x]  Caregiver present  []  Bed alarm activated    COMMUNICATION/EDUCATION:   [x] Role of Occupational Therapy in the acute care setting  [x] Home safety education was provided and the patient/caregiver indicated understanding. [x] Patient/family have participated as able in working towards goals and plan of care. [x] Patient/family agree to work toward stated goals and plan of care. [] Patient understands intent and goals of therapy, but is neutral about his/her participation. [] Patient is unable to participate in goal setting and plan of care.       Thank you for this referral.  Pj Gary OTR/L  Time Calculation: 16 mins

## 2021-05-17 NOTE — ANESTHESIA PREPROCEDURE EVALUATION
Relevant Problems   No relevant active problems       Anesthetic History   No history of anesthetic complications            Review of Systems / Medical History  Patient summary reviewed, nursing notes reviewed and pertinent labs reviewed    Pulmonary  Within defined limits                 Neuro/Psych   Within defined limits           Cardiovascular                  Exercise tolerance: >4 METS     GI/Hepatic/Renal  Within defined limits           Pertinent negatives: No GERD, liver disease and renal disease   Endo/Other    Diabetes  Hypothyroidism  Arthritis     Other Findings              Physical Exam    Airway  Mallampati: II  TM Distance: 4 - 6 cm  Neck ROM: normal range of motion   Mouth opening: Normal     Cardiovascular    Rhythm: regular  Rate: normal         Dental    Dentition: Caps/crowns  Comments: Lower retainer and upper left incisor is indented ib edge   Pulmonary                 Abdominal  GI exam deferred       Other Findings            Anesthetic Plan    ASA: 2  Anesthesia type: general          Induction: Intravenous  Anesthetic plan and risks discussed with: Patient      Ga/lma/tylenol po

## 2021-05-17 NOTE — PROGRESS NOTES
Progression of Symptoms:    [x] Pain 2/10   [] Improvement post medication administration   [] No improvement, provider notified   [] Nausea   [] Improvement post medication administration   [] No improvement, provider notified   [] Hypotension/Hypertension   [] Improved post medication administration   [] No improvement, provider notified     Readiness for Discharge:  [x] Vital signs stable  [x] + Voiding  [x] Wound intact, drainage minimal  [x] Tolerating PO intake  [x] Cleared by PT (OT if applicable)  [x] Discharge education completed with patient and family member to specifically include   [x] Recommended stool softener  [x] Proper elevation visual demonstration

## 2021-05-17 NOTE — PERIOP NOTES
Pt. Used restroom in pre-op area with assistance. Patient placed on Katja Paws for a minimum of 30 min in  Preop. Patient is MRSA +, completed 3 days home skin prep with chg wash but ONLY  3/5 days of bactroban. Upon arrival, the patient wiped down with the chg wipes and was given and completed the Nozin nasal swabs and Vancomycin was ordered. Thus, this patient has NOT completed the bundle and does have to be on contact precautions.

## 2021-05-17 NOTE — PROGRESS NOTES
Same Day Discharge    [phone] - Patient arrives to unit at this time. Dual skin assessment completed with Yan Byers RN, no abnormalities noted other than surgical incision to R hip.  mepilex dressing CDI. Patient oriented to room to include use of call bell, meal ordering, and use of incentive spirometer, patient able to get IS to 500. Patient instructed to order lunch and given explanation of home for dinner plan. Phone and call bell left within reach. Educated on pain medication availability and possible side effects, as well as need to call for assistance before getting out of bed. Patient verbalized understanding.      Symptoms present on arrival:  [x] Pain 2/10   [] Medicated  [] Nausea   [] Medicated   [] Hypotension/Hypertension   [] Provider notified

## 2021-05-17 NOTE — OP NOTES
[address] [facility] [address]  Total Hip Arthroplasty      Patient: Sulma Gonzales MRN: [id number]  SSN: [id number]    YOB: 1965  Age: 54 y.o. Sex: female      Date of Surgery: 5/17/2021   Preoperative Diagnosis: UNILATERAL PRIMARY OSTEOARTHRITIS RIGHT HIP   Postoperative Diagnosis: UNILATERAL PRIMARY OSTEOARTHRITIS RIGHT HIP   Location: [facility]  Surgeon: Sukhdev Headley MD  Assistant: Jose Dominguez PA-C    Anesthesia: general    Procedure: Total Right Hip Arthroplasty    Findings: Degenerative joint disease of the right hip. Estimated Blood Loss: 300ml    Specimens: None    Complications: none    Implants:   Implant Name Type Inv. Item Serial No.  Lot No. LRB No. Used Action   SHELL ACET OD50MM 3 H HIGHLY POR HMSPHR LEGEND - [id number]  SHELL ACET OD50MM 3 H HIGHLY POR HMSPHR LEGEND  [employer] [id number] Right 1 Implanted   LINER ACET OD50MM ID36MM NEUT LEGEND - [id number]  LINER ACET OD50MM ID36MM NEUT LEGEND  [employer] [id number] Right 1 Implanted   STEM FEM SZ 12 STD HIP CLLR MOD ENTRADA - [id number]  STEM FEM SZ 12 STD HIP CLLR MOD ENTRADA  [employer] [id number] Right 1 Implanted   HEAD FEM [id number] +0MM 12 14 TAPR CERAMIC BIOLOX DELT - [id number]  HEAD FEM [id number] +0MM 12 14 TAPR CERAMIC BIOLOX DELT  [employer] [id number] Right 1 Implanted       Procedure Detail:  After the patient was brought to the operating suite, She was effectively anesthetized using general anesthesia, then transferred to the [place] table and secured in a standard fashion. Her right hip was then prepped and draped in a normal sterile orthopedic fashion. She was given appropriate intravenous antibiotics preoperatively. After a proper timeout was performed, a direct anterior approach to the hip was performed using a short Quijano-Aguirre interval. Anterior capsulotomy was performed.  The degenerative changes of the hip were noted. Femoral neck osteotomy was then performed to the templated area. The head and neck were removed. The pulvinar and labrum were excised. The acetabulum was then reamed up to 50 mm with good bleeding cancellous bone obtained. The cup was then irrigated with pulse lavage system. A 50 mm orthodevelopment Legend cup was then impacted in place with excellent stable fixation obtained, placing the cup at about 45 degrees of abduction, 20 degrees of anteversion. The liner was then impacted in place. A screw was not placed. Attention was turned to the femur, which was delivered into the wound with a combination of extension, external rotation, and adduction, and using the hook on the [place] table to deliver the femur into the wound. The canal was broached up to a size 12 for the Entrada stem system with excellent stable fixation obtained. A trial reduction was then performed with the standard neck offset and 36 mm head balls with various neck lengths. With the +0, she appeared to have equalization of leg lengths and restoration of offset radiographically, and excellent functional stability was noted. The trial broach was removed. The canal was irrigated with the pulse lavage system. The final components were impacted in place with excellent stable fixation obtained once again. The final reduction was performed and once again leg lengths and offset were restored radiographically, using the C-arm radiographically intraoperatively, and excellent functional stability was noted. The wound was then irrigated one more time, and then closed in layers. The fascia of the tensor was closed with #1 Vicryl in a running type stitch. Subcutaneous tissue was closed with 2-0 Vicryl in a simple buried stitch, and the skin was closed with Prineo. Dry, sterile dressing was then applied. She tolerated this well, was transferred to the bed, and taken to recovery room, extubated, in stable condition.  All sponge and needle counts were correct.     Signed By: Manuel Bernard MD     May 17, 2021

## 2021-05-17 NOTE — PROGRESS NOTES
Patient transferred to [facility] via stretcher.  VSS     05/17/21 1334   Vital Signs   Temp 97.4 °F (36.3 °C)   Temp Source Oral   Pulse (Heart Rate) (!) 52   Resp Rate 16   /63   Oxygen Therapy   O2 Sat (%) 97 %

## 2021-05-17 NOTE — PROGRESS NOTES
Problem: Self Care Deficits Care Plan (Adult)  Goal: *Acute Goals and Plan of Care (Insert Text)  Description: Initial Occupational Therapy Goals (5/17/2021) Within 7 day(s):    1. Patient will perform grooming standing sinkside with supervision for increased independence with ADLs. 2. Patient will perform LB dressing with supervision & A/E PRN for increased independence with ADLs. 3. Patient will perform toilet transfer with supervision for increased independence with ADLs. 4. Patient will perform all aspects of toileting with supervision for increased independence with ADLs. 5. Patient will independently apply energy conservation techniques with 1 verbal cue(s)for increased independence with ADLs. 6. Patient will perform bathroom mobility with supervision for increased independence/safety with ADLs. Outcome: Progressing Towards Goal   OCCUPATIONAL THERAPY EVALUATION    Patient: Светлана Spaulding (17 y.o. female)  Date: 5/17/2021  Primary Diagnosis: Primary localized osteoarthritis of right hip [M16.11]  Procedure(s) (LRB):  RIGHT TOTAL HIP REPLACEMENT ANTERIOR APPROACH WITH C-ARM (ORTHO DEVELOPMENT) **CONTACT ISOLATION** (Right) Day of Surgery   Precautions: Fall, WBAT  PLOF: pt mod I for ADLs/functional mobility, spouse would don socks/shoes    ASSESSMENT AND RECOMMENDATIONS:  Based on the objective data described below, the patient presents with RLE decreased ROM and strength affecting LE ADLs. Pt found seated in recliner chair, vitals assessed and WNL, pt reporting pain 0/10, agreeable to therapy. Pt drowsy throughout session and required frequent rest breaks. Pt requesting to use bathroom; Pt CGA for STS/bathroom mobility/toilet transfer with vc for proper body mechanics. Pt was able to void, SBA for bladder hygiene. Pt was able to thread B feet through underwear/pants without assist, CGA when standing to pull up to waist. Pt washed hands standing at sink with CGA and ambulated back to recliner chair.  Pt then requesting for OT to leave so she could rest and return later. Spouse present during session for education on home safety. Patient will benefit from skilled Occupational Therapy intervention to maximize safety/independence with ADLs at d/c.    Education: Reviewed home safety, body mechanics, importance of moving every hour to prevent joint stiffness, role of ice for edema/pain control, Rolling Walker management/safety, and adaptive dressing techniques with patient verbalizing  understanding at this time     Patient will benefit from skilled intervention to address the above impairments. Patient's rehabilitation potential is considered to be Good  Factors which may influence rehabilitation potential include:   [x]             None noted  []             Mental ability/status  []             Medical condition  []             Home/family situation and support systems  []             Safety awareness  []             Pain tolerance/management  []             Other:        PLAN :  Recommendations and Planned Interventions:   [x]               Self Care Training                  [x]      Therapeutic Activities  [x]               Functional Mobility Training   []      Cognitive Retraining  []               Therapeutic Exercises           []      Endurance Activities  []               Balance Training                    []      Neuromuscular Re-Education  []               Visual/Perceptual Training     [x]      Home Safety Training  [x]               Patient Education                   [x]      Family Training/Education  []               Other (comment):    Frequency/Duration: Patient will be followed by Occupational Therapy 1-2 times per day/4-7 days per week to address goals. Discharge Recommendations: Home health with adult supervision at least 24 hours after d/c  Further Equipment Recommendations for Discharge: N/A     SUBJECTIVE:   Patient stated i'm just really tired.     OBJECTIVE DATA SUMMARY:     Past Medical History:   Diagnosis Date    Arthritis     Gestational diabetes     Primary localized osteoarthritis of right hip 2021    Thyroid disease     hypo    Type 2 diabetes mellitus ([facility] [address]      Past Surgical History:   Procedure Laterality Date    HX  SECTION   &     HX GASTRIC BYPASS  2003    HX HEENT Bilateral 2019    upper blepharoplasty     Barriers to Learning/Limitations: yes;  physical and altered mental status (i.e.Sedation, Confusion)  Compensate with: visual, verbal, tactile, kinesthetic cues/model    Home Situation/Prior Level of Function:   Home Situation  Home Environment: Private residence  # Steps to Enter: 2  Rails to Enter: Yes  Hand Rails : Right  One/Two Story Residence: One story  Living Alone: No  Support Systems: Spouse/Significant Other/Partner  Patient Expects to be Discharged to[de-identified] Private residence  Current DME Used/Available at Home: Walker, rolling, Raised toilet seat  Tub or Shower Type: Tub/Shower combination  []  Right hand dominant   []  Left hand dominant    Cognitive/Behavioral Status:  Neurologic State: Drowsy; Alert  Orientation Level: Oriented to person;Oriented to place;Oriented to situation  Cognition: Follows commands  Safety/Judgement: Awareness of environment    Skin: R hip incision w/ Mepilex   Edema: compression hose in place & applied ice     Coordination: BUE  Coordination: Generally decreased, functional  Fine Motor Skills-Upper: Left Intact; Right Intact    Gross Motor Skills-Upper: Left Intact; Right Intact    Balance:  Sitting: Intact  Standing: Intact; With support    Strength: BUE  Strength: Generally decreased, functional    Tone & Sensation:BUE  Tone: Normal  Sensation: Impaired(R hip)    Range of Motion: BUE  AROM: Generally decreased, functional    Functional Mobility and Transfers for ADLs:  Bed Mobility:  Scooting: Stand-by assistance    Transfers:  Sit to Stand: Contact guard assistance(vc)   Toilet Transfer : Contact guard assistance(vc)   Bathroom Mobility: Contact guard assistance(vc)    ADL Assessment:  Feeding: Independent  Oral Facial Hygiene/Grooming: Contact guard assistance  Bathing: Minimum assistance  Upper Body Dressing: Stand-by assistance  Lower Body Dressing: Moderate assistance  Toileting: Stand by assistance    ADL Intervention:  Grooming  Grooming Assistance: Contact guard assistance  Position Performed: Standing  Washing Hands: Contact guard assistance    Lower Body Dressing Assistance  Dressing Assistance: Contact guard assistance  Underpants: Contact guard assistance  Pants With Elastic Waist: Contact guard assistance  Leg Crossed Method Used: No  Position Performed: Seated in chair(seated on toilet)  Cues: Verbal cues provided;Visual cues provided    Toileting  Toileting Assistance: Stand-by assistance  Bladder Hygiene: Stand-by assistance  Clothing Management: Stand-by assistance    Cognitive Retraining  Safety/Judgement: Awareness of environment    Pain:  Pain level pre-treatment: 0/10  Pain level post-treatment: 0/10  Pain Intervention(s): Medication administer by Nursing (see MAR); Rest, Ice, Repositioning   Response to intervention: Nurse notified, see doc flow     Activity Tolerance:   Fair-. Patient able to stand ~4 minute(s). Patient able to complete ADLs with intermittent rest breaks. Patient limited by pain, strength, ROM, drowsiness. Patient unsteady. Please refer to the flowsheet for vital signs taken during this treatment. After treatment:   [x]  Patient left in no apparent distress sitting up in chair  []  Patient sitting on EOB  []  Patient left in no apparent distress in bed  [x]  Call bell left within reach  [x]  Nursing notified  [x]  Caregiver present  [x]  Ice applied  []  SCD's on while back in bed  [] Bed alarm activated    COMMUNICATION/EDUCATION:   Communication/Collaboration:  [x]       Role of Occupational Therapy in the acute care setting.   [x]      Home safety education was provided and the patient/caregiver indicated understanding. [x]      Patient/family have participated as able in goal setting and plan of care. [x]      Patient/family agree to work toward stated goals and plan of care. []      Patient understands intent and goals of therapy, but is neutral about his/her participation. []      Patient is unable to participate in plan of care at this time. Thank you for this referral.  Clarita Lord, OTR/L  Time Calculation: 31 mins    Eval Complexity: History: MEDIUM Complexity : Expanded review of history including physical, cognitive and psychosocial  history ; Examination: MEDIUM Complexity : 3-5 performance deficits relating to physical, cognitive , or psychosocial skils that result in activity limitations and / or participation restrictions; Decision Making:MEDIUM Complexity : Patient may present with comorbidities that affect occupational performnce.  Miniml to moderate modification of tasks or assistance (eg, physical or verbal ) with assesment(s) is necessary to enable patient to complete evaluation

## 2021-05-17 NOTE — DISCHARGE SUMMARY
[address] [address]     DISCHARGE SUMMARY     PATIENT: Corina Betts     MRN: [id number]   ADMIT DATE: 2021   BILLIN   DISCHARGE DATE: 2021     ATTENDING: Christina Spaulding MD   DICTATING: VANE Griggs     ADMISSION DIAGNOSIS: Primary localized osteoarthritis of right hip [M16.11]    DISCHARGE DIAGNOSIS: Status post RIGHT TOTAL HIP ARTHROPLASTY    HISTORY OF PRESENT ILLNESS: The patient is a 54y.o. year-old female   with ongoing right hip pain secondary to osteoarthritis of right hip. The patient's pain has persisted and progressed despite conservative treatments and therapies. The patient has at this time opted for surgical intervention. PAST MEDICAL HISTORY:   Past Medical History:   Diagnosis Date    Arthritis     Gestational diabetes     Primary localized osteoarthritis of right hip 2021    Thyroid disease     hypo    Type 2 diabetes mellitus ([facility] [address]        PAST SURGICAL HISTORY:   Past Surgical History:   Procedure Laterality Date    HX  SECTION   &     HX GASTRIC BYPASS      HX HEENT Bilateral 2019    upper blepharoplasty       ALLERGIES: No Known Allergies     CURRENT MEDICATIONS:  A list of medications prior to the time of admission include:  Prior to Admission medications    Medication Sig Start Date End Date Taking? Authorizing Provider   aspirin (ASPIRIN) 325 mg tablet Take 1 Tab by mouth two (2) times a day for 21 days. 21 Yes Antonio Guajardo PA   cefadroxil (DURICEF) 500 mg capsule Take 1 Cap by mouth two (2) times a day for 5 days. 21 Yes Antonio Guajardo PA   meloxicam (MOBIC) 7.5 mg tablet Take 1 Tab by mouth two (2) times a day for 14 days. 21 Yes Antonio Guajardo PA   oxyCODONE IR (ROXICODONE) 5 mg immediate release tablet Take 1 Tab by mouth every four (4) hours as needed for Pain for up to 7 days. Max Daily Amount: 30 mg. 21 Yes Antonio Guajardo PA   cyanocobalamin 1,000 mcg tablet Take 1,000 mcg by mouth daily. Yes Provider, Historical   levothyroxine (synthroid) 50 mcg tablet Take 50 mcg by mouth Daily (before breakfast). Yes Provider, Historical   levothyroxine (synthroid) 25 mcg tablet Take 25 mcg by mouth. Saturday and    Yes Provider, Historical       FAMILY HISTORY: History reviewed. No pertinent family history. SOCIAL HISTORY:   Social History     Socioeconomic History    Marital status:      Spouse name: Not on file    Number of children: Not on file    Years of education: Not on file    Highest education level: Not on file   Tobacco Use    Smoking status: Former Smoker     Quit date:      Years since quittin.3    Smokeless tobacco: Never Used   Substance and Sexual Activity    Alcohol use: Not Currently    Drug use: Not Currently    Sexual activity: Yes       REVIEW OF SYSTEMS: All review of systems are negative. PHYSICAL EXAMINATION: For a detailed physical exam, please refer to the patient's chart. HOSPITAL COURSE: The patient was taken to surgery the day of admission. she underwent right total hip replacement via the anterior approach. Operative course was benign. Estimated blood loss approximately 300 cc. The patient was taken to the PACU in stable condition and was later taken to the floor in stable condition. Post-op Day of surgery, patient has done very well.  she has had little to no pain. she had been cleared by physical therapy with stair training. she was placed on Aspirin for DVT prophylaxis. her vitals have remained stable. she has also remained hemodynamically stable. The patient has been recommended for discharge home. DISCHARGE INSTRUCTIONS: The patient is to be discharged home.  she is to continue on her prior medications per the medication reconciliation form, to which we will add:         1)  Aspirin 325 mg; 1 tablet p.o. b.i.d. X 21 days         2)  Mobic 7.5 mg; 1 tablet p.o. BID x 14 days           3)  Roxicodone 5 mg; 1 tablets p.o. every 4 hours p.r.n. for pain         4)  Duricef 500 mg; 1 tablet p.o. every 12 hours x 5 days    The patient is to continue at home with home physical therapy 3 times a week to work on gait training, range of motion, strengthening, and weightbearing exercises as tolerated on her right lower extremity. The patient is to progress from a walker to a cane to complete total weightbearing as tolerable. The patient is to continue to keep her incision dry. The patient is to followup with Dr. Sandy Erazo, Rohini Phelps PA-C, and/or [facility] in the office approximately 10-14 days status post for x-rays and further evaluation.       Lilia Marcos [facility], [place]  7/93/31240:69 PM

## 2021-05-17 NOTE — PROGRESS NOTES
Problem: Mobility Impaired (Adult and Pediatric)  Goal: *Acute Goals and Plan of Care (Insert Text)  Description: Physical Therapy short term goals initiated 05/17/21, to be achieved in 2 days. Pt will:   1. Perform bed mobility with indep in prep for OOB activity. 2. Perform sit <> stand transfers with RW and S in prep for functional mobility and ambulation. 3. Ambulate 150 ft with RW and S in prep for household and community mobility. 4. Ascend/descend 2 stairs with 1 HR and S for safe home entry. Note: []  Patient has met MD grayson mays for d/c home   []  Recommend HH with 24 hour adult care   [x]  Benefit from additional acute PT session to address: incr gait tolerance, stair training    PHYSICAL THERAPY EVALUATION    Patient: Alline Krabbe (51 y.o. female)  Date: 5/17/2021  Primary Diagnosis: Primary localized osteoarthritis of right hip [M16.11]  Procedure(s) (LRB):  RIGHT TOTAL HIP REPLACEMENT ANTERIOR APPROACH WITH C-ARM (ORTHO DEVELOPMENT) **CONTACT ISOLATION** (Right) Day of Surgery   Precautions:  Fall, WBAT  WBAT  PLOF: Pt was independent with household and community mobility with no AD PTA. ASSESSMENT :  Based on the objective data described below, the patient presents with decr R LE strength and  ROM, decr activity tolerance, decr balance, and R hip pain resulting in deficits in transfers and gait quality and tolerance. Pt seen with OT for additional set of skilled hands. Pt sitting in recliner on PT entry, rated R hip pain 0/10. Pt required ** for sit <> stand transfers with RW and vc for safe hand placement. Pt ambulated ambulated to bathroom with RW and CGA, antalgic pattern. Pt able to void, requesting to return to chair and rest d/t fatigue. Pt returned to chair, educated on PT plan of care. Pt left sitting in recliner with all needs met, all questions answered, and ice to R hip.  Pt would benefit from additional skilled therapy in order to incr functional mobility and promote safe discharge home. Recommend HHPT after discharge. Patient will benefit from skilled intervention to address the above impairments. Patient's rehabilitation potential is considered to be Good  Factors which may influence rehabilitation potential include:   []         None noted  []         Mental ability/status  []         Medical condition  []         Home/family situation and support systems  []         Safety awareness  [x]         Pain tolerance/management  []         Other:      PLAN :  Recommendations and Planned Interventions:   [x]           Bed Mobility Training             []    Neuromuscular Re-Education  [x]           Transfer Training                   []    Orthotic/Prosthetic Training  [x]           Gait Training                          [x]    Modalities  [x]           Therapeutic Exercises           []    Edema Management/Control  [x]           Therapeutic Activities            [x]    Family Training/Education  [x]           Patient Education  []           Other (comment):    Frequency/Duration: Patient will be followed by physical therapy twice daily to address goals. Discharge Recommendations: Home Health  Further Equipment Recommendations for Discharge: N/A     SUBJECTIVE:   Patient stated i'm just so tired.     OBJECTIVE DATA SUMMARY:     Past Medical History:   Diagnosis Date    Arthritis     Gestational diabetes     Primary localized osteoarthritis of right hip 2021    Thyroid disease     hypo    Type 2 diabetes mellitus ([facility] [address]      Past Surgical History:   Procedure Laterality Date    HX  SECTION   &     HX GASTRIC BYPASS      HX HEENT Bilateral 2019    upper blepharoplasty     Barriers to Learning/Limitations: yes;  other post-anesthesia  Compensate with: Verbal Cues and Tactile Cues  Home Situation:  Home Situation  Home Environment: Private residence  # Steps to Enter: 2  Rails to Enter: Yes  Hand Rails : Right  One/Two Story Residence: One story  Living Alone: No  Support Systems: Spouse/Significant Other/Partner  Patient Expects to be Discharged to[de-identified] Private residence  Current DME Used/Available at Home: Walker, rolling, Raised toilet seat  Tub or Shower Type: Tub/Shower combination  Critical Behavior:  Neurologic State: Drowsy; Alert  Orientation Level: Oriented to person;Oriented to place;Oriented to situation  Cognition: Follows commands  Safety/Judgement: Awareness of environment  Psychosocial  Patient Behaviors: Calm; Cooperative  Family  Behaviors: Calm;Supportive  Purposeful Interaction: Yes  Pt Identified Daily Priority: Clinical issues (comment)  Caritas Process: Nurture loving kindness;Establish trust;Teaching/learning; Attend basic human needs;Create healing environment  Caring Interventions: Reassure; Therapeutic modalities  Reassure: Therapeutic listening; Informing  Therapeutic Modalities: Intentional therapeutic touch  Skin Condition/Temp: Warm;Dry  Family  Behaviors: Calm;Supportive  Skin Integrity: Incision (comment)(R hip)  Skin Integumentary  Skin Color: Appropriate for ethnicity  Skin Condition/Temp: Warm;Dry  Skin Integrity: Incision (comment)(R hip)  Strength:    Strength: Generally decreased, functional  Tone & Sensation:   Tone: Normal  Sensation: Impaired(R hip)  Range Of Motion:  AROM: Generally decreased, functional  Functional Mobility:  Bed Mobility:  Scooting: Stand-by assistance  Transfers:  Sit to Stand: Contact guard assistance;Stand-by assistance  Stand to Sit: Contact guard assistance;Stand-by assistance  Balance:   Sitting: Intact  Standing: Intact; With support  Ambulation/Gait Training:  Distance (ft): 20 Feet (ft)  Assistive Device: Gait belt;Walker, rolling  Ambulation - Level of Assistance: Contact guard assistance  Gait Abnormalities: Antalgic;Decreased step clearance; Step to gait  Base of Support: Narrowed  Stance: Right decreased  Speed/Marlyn: Slow  Step Length: Left shortened;Right shortened  Interventions: Safety awareness training; Tactile cues; Verbal cues  Pain:  Pain level pre-treatment: 0/10   Pain level post-treatment: 0/10   Pain Intervention(s) : Medication (see MAR); Rest, Ice, Repositioning  Response to intervention: Nurse notified, See doc flow  Activity Tolerance:   Pt tolerated amb in room with Rw and CGA with no LOB, reported fatigue and requesting rest break after amb <> bathroom. Please refer to the flowsheet for vital signs taken during this treatment. After treatment:   [x]         Patient left in no apparent distress sitting up in chair  []         Patient left in no apparent distress in bed  [x]         Call bell left within reach  [x]         Nursing notified  []         Caregiver present  []         Bed alarm activated  []         SCDs applied    COMMUNICATION/EDUCATION:   [x]         Role of Physical Therapy in the acute care setting. [x]         Fall prevention education was provided and the patient/caregiver indicated understanding. [x]         Patient/family have participated as able in goal setting and plan of care. [x]         Patient/family agree to work toward stated goals and plan of care. []         Patient understands intent and goals of therapy, but is neutral about his/her participation. []         Patient is unable to participate in goal setting/plan of care: ongoing with therapy staff.  []         Other:     Thank you for this referral.  Liz Donnelly   Time Calculation: 26 mins      Eval Complexity: History: LOW Complexity : Zero comorbidities / personal factors that will impact the outcome / POCExam:LOW Complexity : 1-2 Standardized tests and measures addressing body structure, function, activity limitation and / or participation in recreation  Presentation: LOW Complexity : Stable, uncomplicated  Clinical Decision Making:Low Complexity    Overall Complexity:LOW

## 2021-05-17 NOTE — ROUTINE PROCESS
TRANSFER - IN REPORT: 
 
Verbal report received from [facility]) on Emi Perdue  being received from [employer] [facility]) for routine post - op Report consisted of patients Situation, Background, Assessment and  
Recommendations(SBAR). Information from the following report(s) SBAR, Kard[facility], [facility] and Recent Results was reviewed with the receiving nurse. Opportunity for questions and clarification was provided.    
 
Assessment completed upon patients arrival to unit and care assumed. '

## 2021-05-17 NOTE — INTERVAL H&P NOTE
Update History & Physical 
 
The Patient's History and Physical of May 11,  
2021 was reviewed with the patient and I examined the patient. There was no change. The surgical site was confirmed by the patient and me. Plan:  The risk, benefits, expected outcome, and alternative to the recommended procedure have been discussed with the patient. Patient understands and wants to proceed with the procedure.  
 
Electronically signed by Dallin Galloway MD on 5/17/2021 at 9:07 AM

## 2021-05-18 ENCOUNTER — HOME CARE VISIT (OUTPATIENT)
Dept: HOME HEALTH SERVICES | Facility: HOME HEALTH | Age: 56
End: 2021-05-18

## 2021-05-18 ENCOUNTER — HOME CARE VISIT (OUTPATIENT)
Dept: SCHEDULING | Facility: HOME HEALTH | Age: 56
End: 2021-05-18
Payer: COMMERCIAL

## 2021-05-18 VITALS
TEMPERATURE: 98.4 F | SYSTOLIC BLOOD PRESSURE: 104 MMHG | HEART RATE: 68 BPM | RESPIRATION RATE: 16 BRPM | DIASTOLIC BLOOD PRESSURE: 62 MMHG | OXYGEN SATURATION: 97 %

## 2021-05-18 PROCEDURE — 400013 HH SOC

## 2021-05-18 PROCEDURE — A6213 FOAM DRG >16<=48 SQ IN W/BDR: HCPCS

## 2021-05-18 PROCEDURE — G0151 HHCP-SERV OF PT,EA 15 MIN: HCPCS

## 2021-05-19 ENCOUNTER — TELEPHONE (OUTPATIENT)
Dept: OTHER | Age: 56
End: 2021-05-19

## 2021-05-19 ENCOUNTER — HOME CARE VISIT (OUTPATIENT)
Dept: HOME HEALTH SERVICES | Facility: HOME HEALTH | Age: 56
End: 2021-05-19
Payer: COMMERCIAL

## 2021-05-19 PROCEDURE — G0157 HHC PT ASSISTANT EA 15: HCPCS

## 2021-05-19 NOTE — TELEPHONE ENCOUNTER
Post Discharge Phone placed after Joint Replacement surgery   Spoke with Stacey Ochoa    Patient general feeling since discharge is going well. Pain Control (0-10 score): 1-2  Pain management reviewed:     Reviewed pain medications. Going to start taking Tylenol ES 1 pill every 4 hours and 2 pill every 6 hours. Patient states swelling is manageable    Patient reminded to elevate leg above heart level and use ice to help with swelling and pain relief. Reviewed walking every hour while awake     PT is scheduled. Is doing her exercises as instructed. Denies N/V, appetite is good. Reviewed the importance of eating  healthy to heal and have energy. Bowels have moved. Patient states dressing is intact without drainage.    Denies fever, cough, chest pain, SOB  Denies any unusual symptoms    Follow up appointment is scheduled with surgeon     States discharge instructions were clear and easy to understand has no questions    Opportunity given for patient to ask questions

## 2021-05-20 ENCOUNTER — HOME CARE VISIT (OUTPATIENT)
Dept: HOME HEALTH SERVICES | Facility: HOME HEALTH | Age: 56
End: 2021-05-20
Payer: COMMERCIAL

## 2021-05-21 ENCOUNTER — HOME CARE VISIT (OUTPATIENT)
Dept: SCHEDULING | Facility: HOME HEALTH | Age: 56
End: 2021-05-21
Payer: COMMERCIAL

## 2021-05-21 PROCEDURE — G0157 HHC PT ASSISTANT EA 15: HCPCS

## 2021-05-24 ENCOUNTER — HOME CARE VISIT (OUTPATIENT)
Dept: SCHEDULING | Facility: HOME HEALTH | Age: 56
End: 2021-05-24
Payer: COMMERCIAL

## 2021-05-24 PROCEDURE — G0157 HHC PT ASSISTANT EA 15: HCPCS

## 2021-05-25 VITALS
DIASTOLIC BLOOD PRESSURE: 72 MMHG | SYSTOLIC BLOOD PRESSURE: 112 MMHG | OXYGEN SATURATION: 98 % | TEMPERATURE: 99.9 F | HEART RATE: 68 BPM

## 2021-05-26 ENCOUNTER — HOME CARE VISIT (OUTPATIENT)
Dept: SCHEDULING | Facility: HOME HEALTH | Age: 56
End: 2021-05-26
Payer: COMMERCIAL

## 2021-05-26 PROCEDURE — G0157 HHC PT ASSISTANT EA 15: HCPCS

## 2021-05-27 VITALS
DIASTOLIC BLOOD PRESSURE: 66 MMHG | SYSTOLIC BLOOD PRESSURE: 125 MMHG | DIASTOLIC BLOOD PRESSURE: 80 MMHG | OXYGEN SATURATION: 96 % | OXYGEN SATURATION: 99 % | HEART RATE: 63 BPM | TEMPERATURE: 99.6 F | TEMPERATURE: 100 F | SYSTOLIC BLOOD PRESSURE: 122 MMHG | SYSTOLIC BLOOD PRESSURE: 109 MMHG | TEMPERATURE: 99.3 F | DIASTOLIC BLOOD PRESSURE: 72 MMHG | OXYGEN SATURATION: 98 % | HEART RATE: 69 BPM | HEART RATE: 79 BPM

## 2021-05-28 ENCOUNTER — HOME CARE VISIT (OUTPATIENT)
Dept: SCHEDULING | Facility: HOME HEALTH | Age: 56
End: 2021-05-28
Payer: COMMERCIAL

## 2021-05-28 PROCEDURE — G0157 HHC PT ASSISTANT EA 15: HCPCS

## 2021-05-29 VITALS
SYSTOLIC BLOOD PRESSURE: 116 MMHG | OXYGEN SATURATION: 98 % | DIASTOLIC BLOOD PRESSURE: 71 MMHG | HEART RATE: 75 BPM | TEMPERATURE: 99 F

## 2021-06-01 ENCOUNTER — HOME CARE VISIT (OUTPATIENT)
Dept: SCHEDULING | Facility: HOME HEALTH | Age: 56
End: 2021-06-01
Payer: COMMERCIAL

## 2021-06-01 VITALS
OXYGEN SATURATION: 99 % | RESPIRATION RATE: 16 BRPM | SYSTOLIC BLOOD PRESSURE: 124 MMHG | DIASTOLIC BLOOD PRESSURE: 78 MMHG | HEART RATE: 67 BPM | TEMPERATURE: 97.8 F

## 2021-06-01 PROCEDURE — G0151 HHCP-SERV OF PT,EA 15 MIN: HCPCS

## 2022-03-18 PROBLEM — M16.11 PRIMARY LOCALIZED OSTEOARTHRITIS OF RIGHT HIP: Status: ACTIVE | Noted: 2021-05-11

## 2023-07-18 ENCOUNTER — HOSPITAL ENCOUNTER (OUTPATIENT)
Facility: HOSPITAL | Age: 58
Discharge: HOME | End: 2023-07-21
Attending: ORTHOPAEDIC SURGERY
Payer: COMMERCIAL

## 2023-07-18 DIAGNOSIS — M25.511 RIGHT SHOULDER PAIN, UNSPECIFIED CHRONICITY: ICD-10-CM

## 2023-07-18 DIAGNOSIS — M25.811 IMPINGEMENT OF RIGHT SHOULDER: ICD-10-CM

## 2023-07-18 PROCEDURE — 73221 MRI JOINT UPR EXTREM W/O DYE: CPT

## (undated) DEVICE — GARMENT,MEDLINE,DVT,INT,CALF,MED, GEN2: Brand: MEDLINE

## (undated) DEVICE — SOL IRRIGATION INJ NACL 0.9% 500ML BTL

## (undated) DEVICE — BLADE SAW 1.27X13X90 MM FOR LG BNE

## (undated) DEVICE — SOL INJ L R 1000ML BG --

## (undated) DEVICE — NEEDLE SPNL 20GA L3.5IN YEL HUB S STL REG WALL FIT STYL W/

## (undated) DEVICE — ZIP 16 SURGICAL SKIN CLOSURE DEVICE: Brand: ZIP 16 SURGICAL SKIN CLOSURE DEVICE

## (undated) DEVICE — SUT VCRL + 2-0 36IN CT1 UD --

## (undated) DEVICE — PACK PROCEDURE SURG TOT HIP ANTR CARTER CUST

## (undated) DEVICE — HANDPIECE SET WITH HIGH FLOW TIP AND SUCTION TUBE: Brand: INTERPULSE

## (undated) DEVICE — BIPOLAR SEALER 23-301-1 AQM MBS: Brand: AQUAMANTYS™

## (undated) DEVICE — SUT VCRL + 1 36IN CT1 VIO --

## (undated) DEVICE — ROCKER SWITCH PENCIL HOLSTER: Brand: VALLEYLAB

## (undated) DEVICE — DRESSING ALG W4XL8IN AG FOAM SUPERABSORBENT SIL ANTIMIC

## (undated) DEVICE — BLADE ELECTRODE: Brand: EDGE